# Patient Record
Sex: FEMALE | Race: WHITE | NOT HISPANIC OR LATINO | Employment: OTHER | URBAN - METROPOLITAN AREA
[De-identification: names, ages, dates, MRNs, and addresses within clinical notes are randomized per-mention and may not be internally consistent; named-entity substitution may affect disease eponyms.]

---

## 2019-05-07 ENCOUNTER — TELEPHONE (OUTPATIENT)
Dept: NEUROSURGERY | Facility: CLINIC | Age: 54
End: 2019-05-07

## 2019-05-07 DIAGNOSIS — Z86.018 HISTORY OF MENINGIOMA: Primary | ICD-10-CM

## 2019-06-26 ENCOUNTER — HOSPITAL ENCOUNTER (OUTPATIENT)
Dept: MRI IMAGING | Facility: HOSPITAL | Age: 54
Discharge: HOME | End: 2019-06-26
Admitting: NURSE PRACTITIONER

## 2019-06-26 DIAGNOSIS — Z86.018 HISTORY OF MENINGIOMA: ICD-10-CM

## 2019-06-26 PROCEDURE — 82565 ASSAY OF CREATININE: CPT

## 2019-06-26 PROCEDURE — A9577 INJ MULTIHANCE: HCPCS | Performed by: NURSE PRACTITIONER

## 2019-06-26 PROCEDURE — 70553 MRI BRAIN STEM W/O & W/DYE: CPT

## 2019-06-26 PROCEDURE — 0 GADOBENATE DIMEGLUMINE 529 MG/ML SOLUTION: Performed by: NURSE PRACTITIONER

## 2019-06-26 RX ADMIN — GADOBENATE DIMEGLUMINE 10 ML: 529 INJECTION, SOLUTION INTRAVENOUS at 15:19

## 2019-06-27 LAB — CREAT BLDA-MCNC: 0.8 MG/DL (ref 0.6–1.3)

## 2019-07-25 ENCOUNTER — OFFICE VISIT (OUTPATIENT)
Dept: NEUROSURGERY | Facility: CLINIC | Age: 54
End: 2019-07-25

## 2019-07-25 VITALS
DIASTOLIC BLOOD PRESSURE: 66 MMHG | SYSTOLIC BLOOD PRESSURE: 96 MMHG | HEART RATE: 80 BPM | BODY MASS INDEX: 18.8 KG/M2 | HEIGHT: 66 IN | RESPIRATION RATE: 12 BRPM | WEIGHT: 117 LBS

## 2019-07-25 DIAGNOSIS — H93.A1 PULSATILE TINNITUS OF RIGHT EAR: Primary | ICD-10-CM

## 2019-07-25 DIAGNOSIS — Z86.018 HISTORY OF MENINGIOMA: ICD-10-CM

## 2019-07-25 PROCEDURE — 99214 OFFICE O/P EST MOD 30 MIN: CPT | Performed by: NURSE PRACTITIONER

## 2019-07-25 RX ORDER — LEVOTHYROXINE SODIUM 0.03 MG/1
TABLET ORAL
COMMUNITY
Start: 2019-07-22 | End: 2022-09-01

## 2019-07-25 RX ORDER — ACETAMINOPHEN 500 MG
500 TABLET ORAL EVERY 6 HOURS PRN
COMMUNITY

## 2019-07-25 NOTE — PROGRESS NOTES
"Subjective   Patient ID: Shani Ladd is a 53 y.o. female is here today for follow-up of 3Y meningioma.  Pt is unaccompanied.    History of Present Illness    She returns to the office today for ongoing follow-up with history of a cerebral meningioma.  She has had new MRI brain imaging at [facility] on June 26.  She was last here in July 2016 and MRI imaging revealed no residual or recurrent tumor status post craniotomy and resection in 2012 of the WHO grade 1 meningioma.    She denies any ongoing issues with the exception of migraine headaches.  She does report some pulsatile tinnitus that has been present since she had encephalitis in 2014.  However, over the past 6 months it has worsened to the point that she hears it at all times now and is becoming very difficult to deal with.  She states even at night when lying down she has a hard time sleeping as this is when she hears it more loudly.  It is only in the right ear.  She does continue with migraines in about 6 months ago began developing right-sided headaches.  She denies any vision changes, dizziness or lightheadedness.    She presents unaccompanied.      BP 96/66 (BP Location: Right arm, Patient Position: Sitting, Cuff Size: Adult)   Pulse 80   Resp 12   Ht 167.6 cm (66\")   Wt 53.1 kg (117 lb)   BMI 18.88 kg/m²     The following portions of the patient's history were reviewed and updated as appropriate: allergies, current medications, past family history, past medical history, past social history, past surgical history and problem list.    Review of Systems   Eyes: Positive for visual disturbance (blurred vision).   Musculoskeletal: Positive for gait problem.   Neurological: Positive for dizziness, speech difficulty, weakness, light-headedness and headaches. Negative for tremors, seizures, syncope, facial asymmetry and numbness.   Psychiatric/Behavioral: Positive for confusion and decreased concentration.       Objective   Physical Exam   Constitutional: " She is oriented to person, place, and time. Vital signs are normal. She appears well-developed and well-nourished. She is cooperative.  Non-toxic appearance. She does not have a sickly appearance. She does not appear ill.   Very pleasant well-appearing middle-aged female   HENT:   Head: Normocephalic and atraumatic.   Eyes: EOM are normal.   Neck: Neck supple. No tracheal deviation present.   Cardiovascular: Normal heart sounds and intact distal pulses.   Pulmonary/Chest: Effort normal.   Musculoskeletal:   Moving all extremities well   Neurological: She is alert and oriented to person, place, and time. She has normal strength. She displays no tremor. No cranial nerve deficit. Coordination and gait normal. GCS eye subscore is 4. GCS verbal subscore is 5. GCS motor subscore is 6.   Gait is stable and upright   Skin: Skin is warm and dry.   Psychiatric: She has a normal mood and affect. Her behavior is normal. Thought content normal.   Vitals reviewed.    Neurologic Exam     Mental Status   Oriented to person, place, and time.     Cranial Nerves     CN III, IV, VI   Extraocular motions are normal.     Motor Exam     Strength   Strength 5/5 throughout.       Assessment/Plan   Independent Review of Radiographic Studies:    MRI brain from [facility] dated June 26, 2019 shows stable findings with no evidence of residual or recurrent meningioma.    Medical Decision Making:    Returns to the office today for ongoing follow-up with history of meningioma resection in 2012 that is been followed with serial imaging since.  Exam as noted above, no red flags.  MRI brain shows stable findings with no evidence of recurrent tumor.  She does report worsening pulsatile tinnitus on the right ear that is now resulting in difficulty sleeping.  I have ordered an MRA of her head for further evaluation.  We will see her back in the office once the imaging study is complete.  We will see her back in 4 years for the history of  meningioma.    Plan: MRI head, return to office following  Shani was seen today for 3y meningioma.    Diagnoses and all orders for this visit:    Pulsatile tinnitus of right ear  -     MRI Angiogram Head Without Contrast; Future    History of meningioma  -     MRI Angiogram Head Without Contrast; Future      No Follow-up on file.

## 2019-08-16 ENCOUNTER — HOSPITAL ENCOUNTER (OUTPATIENT)
Dept: MRI IMAGING | Facility: HOSPITAL | Age: 54
Discharge: HOME | End: 2019-08-16
Admitting: NURSE PRACTITIONER

## 2019-08-16 DIAGNOSIS — Z86.018 HISTORY OF MENINGIOMA: ICD-10-CM

## 2019-08-16 DIAGNOSIS — H93.A1 PULSATILE TINNITUS OF RIGHT EAR: ICD-10-CM

## 2019-08-16 PROCEDURE — 70544 MR ANGIOGRAPHY HEAD W/O DYE: CPT

## 2019-08-23 ENCOUNTER — OFFICE VISIT (OUTPATIENT)
Dept: NEUROSURGERY | Facility: CLINIC | Age: 54
End: 2019-08-23

## 2019-08-23 VITALS
HEART RATE: 76 BPM | HEIGHT: 66 IN | SYSTOLIC BLOOD PRESSURE: 104 MMHG | DIASTOLIC BLOOD PRESSURE: 70 MMHG | RESPIRATION RATE: 16 BRPM | BODY MASS INDEX: 18.8 KG/M2 | WEIGHT: 117 LBS

## 2019-08-23 DIAGNOSIS — Z86.018 HISTORY OF MENINGIOMA: ICD-10-CM

## 2019-08-23 DIAGNOSIS — Z86.69 HX OF MIGRAINES: ICD-10-CM

## 2019-08-23 DIAGNOSIS — H93.A1 PULSATILE TINNITUS OF RIGHT EAR: Primary | ICD-10-CM

## 2019-08-23 PROCEDURE — 99214 OFFICE O/P EST MOD 30 MIN: CPT | Performed by: NURSE PRACTITIONER

## 2019-08-23 RX ORDER — TRAMADOL HYDROCHLORIDE 50 MG/1
TABLET ORAL
COMMUNITY
Start: 2019-08-13

## 2019-08-23 RX ORDER — LEVOTHYROXINE SODIUM 50 MCG
TABLET ORAL
COMMUNITY
Start: 2019-08-08 | End: 2022-09-01

## 2019-08-23 NOTE — PROGRESS NOTES
"Subjective   Patient ID: Shani Ladd is a 54 y.o. female is here today for follow-up of tinnitus.  Pt is accompanied by her .    History of Present Illness     She returns to the office today for ongoing follow-up with history of tinnitus as well as history of a cerebral meningioma.  She is status post craniotomy and meningioma resection in 2012 of the WHO grade 1 meningioma.  She has been followed with serial imaging since.  At her last office visit she reported migraine headaches as well as pulsatile tinnitus that has been present since she had encephalitis in 2014.  She stated that over the past 6 months it is worsened to the point that she hears all the time and it is now becoming very difficult to deal with.  She underwent MRA head imaging for further evaluation.    She reports ongoing right-sided pulsatile tinnitus that is overall improved since her last office visit.  Her biggest issue is the ongoing migraine headaches.  She was previously followed by neurology but has not seen a neurologist in \"years.\"  She also feels confused at times with intermittent dizziness.  Otherwise, she states that she is doing and feeling well.    She presents with her .    /70 (BP Location: Right arm, Patient Position: Sitting, Cuff Size: Adult)   Pulse 76   Resp 16   Ht 167.6 cm (66\")   Wt 53.1 kg (117 lb)   BMI 18.88 kg/m²     The following portions of the patient's history were reviewed and updated as appropriate: allergies, current medications, past family history, past medical history, past social history, past surgical history and problem list.    Review of Systems   HENT: Positive for tinnitus (pulsatile).    Musculoskeletal: Negative for gait problem. Myalgias: pain in head.   Neurological: Positive for dizziness, speech difficulty (sometimes), light-headedness and headaches. Negative for tremors, seizures, syncope, facial asymmetry, weakness and numbness.       Objective   Physical Exam "   Constitutional: She is oriented to person, place, and time. Vital signs are normal. She appears well-developed and well-nourished. She is cooperative. She does not have a sickly appearance. She does not appear ill.   Very pleasant well-appearing thin middle-aged female   HENT:   Head: Normocephalic and atraumatic.   Eyes: EOM are normal. Pupils are equal, round, and reactive to light.   Neck: Neck supple.   Pulmonary/Chest: Effort normal.   Musculoskeletal: Normal range of motion.   Moving all extremities well   Neurological: She is alert and oriented to person, place, and time. She has normal strength. She displays no tremor. No cranial nerve deficit. Coordination and gait normal. GCS eye subscore is 4. GCS verbal subscore is 5. GCS motor subscore is 6.   Gait is stable and upright     Skin: Skin is warm and dry.   Psychiatric: She has a normal mood and affect. Her behavior is normal. Thought content normal.   Vitals reviewed.    Neurologic Exam     Mental Status   Oriented to person, place, and time.     Cranial Nerves     CN III, IV, VI   Pupils are equal, round, and reactive to light.  Extraocular motions are normal.     Motor Exam     Strength   Strength 5/5 throughout.       Assessment/Plan   Independent Review of Radiographic Studies:    MRA head from [facility] reveals normal findings.  There is evidence of the left suboccipital craniotomy for resection of the prior meningioma.  No etiology for the right sided pulsatile tinnitus.    Medical Decision Making:    She returns to the office today for ongoing follow-up with history of her cerebral meningioma and more recent right sided pulsatile tinnitus.  Exam as noted above, no red flags.  MRA head from [facility] shows no acute abnormalities.  There is no etiology for the right sided pulsatile tinnitus.  She is clinically stable but does continue to report significant, daily, migraine headache pain.  She is requesting a referral  to neurology.  She is clinically intact from our standpoint and will proceed with normal follow-up in 3 years with MRI brain for continued surveillance of the known meningioma.  She is to call at anytime with any questions or concerns.    Plan: Referral to neurology for headache    Shani was seen today for tinnitus.    Diagnoses and all orders for this visit:    Pulsatile tinnitus of right ear  -     Ambulatory Referral to Neurology    History of meningioma  -     MRI Brain With & Without Contrast; Future    Hx of migraines  -     Ambulatory Referral to Neurology      Return in about 3 years (around 8/23/2022) for Follow up as scheduled.

## 2022-05-04 DIAGNOSIS — Z86.018 HISTORY OF MENINGIOMA: Primary | ICD-10-CM

## 2022-06-03 ENCOUNTER — TELEPHONE (OUTPATIENT)
Dept: NEUROSURGERY | Facility: CLINIC | Age: 57
End: 2022-06-03

## 2022-06-16 ENCOUNTER — TELEPHONE (OUTPATIENT)
Dept: NEUROSURGERY | Facility: CLINIC | Age: 57
End: 2022-06-16

## 2022-08-03 ENCOUNTER — APPOINTMENT (OUTPATIENT)
Dept: MRI IMAGING | Facility: HOSPITAL | Age: 57
End: 2022-08-03

## 2022-08-10 ENCOUNTER — HOSPITAL ENCOUNTER (OUTPATIENT)
Dept: MRI IMAGING | Facility: HOSPITAL | Age: 57
Discharge: HOME | End: 2022-08-10
Admitting: NURSE PRACTITIONER

## 2022-08-10 DIAGNOSIS — Z86.018 HISTORY OF MENINGIOMA: ICD-10-CM

## 2022-08-10 PROCEDURE — A9577 INJ MULTIHANCE: HCPCS | Performed by: NURSE PRACTITIONER

## 2022-08-10 PROCEDURE — 70553 MRI BRAIN STEM W/O & W/DYE: CPT

## 2022-08-10 PROCEDURE — 0 GADOBENATE DIMEGLUMINE 529 MG/ML SOLUTION: Performed by: NURSE PRACTITIONER

## 2022-08-10 RX ADMIN — GADOBENATE DIMEGLUMINE 10 ML: 529 INJECTION, SOLUTION INTRAVENOUS at 15:32

## 2022-09-01 ENCOUNTER — OFFICE VISIT (OUTPATIENT)
Dept: NEUROSURGERY | Facility: CLINIC | Age: 57
End: 2022-09-01

## 2022-09-01 VITALS
BODY MASS INDEX: 18.96 KG/M2 | SYSTOLIC BLOOD PRESSURE: 104 MMHG | OXYGEN SATURATION: 99 % | HEIGHT: 66 IN | WEIGHT: 118 LBS | DIASTOLIC BLOOD PRESSURE: 72 MMHG | HEART RATE: 79 BPM

## 2022-09-01 DIAGNOSIS — Z86.018 HISTORY OF MENINGIOMA: Primary | ICD-10-CM

## 2022-09-01 PROCEDURE — 99203 OFFICE O/P NEW LOW 30 MIN: CPT | Performed by: NURSE PRACTITIONER

## 2022-09-01 RX ORDER — LEVOTHYROXINE SODIUM 0.07 MG/1
TABLET ORAL
COMMUNITY

## 2022-09-01 RX ORDER — LAMOTRIGINE 100 MG/1
TABLET, EXTENDED RELEASE ORAL
COMMUNITY

## 2022-09-01 RX ORDER — PANTOPRAZOLE SODIUM 40 MG/1
TABLET, DELAYED RELEASE ORAL
COMMUNITY
Start: 2022-08-01

## 2025-02-17 ENCOUNTER — TELEPHONE (OUTPATIENT)
Dept: FAMILY MEDICINE CLINIC | Facility: CLINIC | Age: 60
End: 2025-02-17
Payer: COMMERCIAL

## 2025-02-17 NOTE — TELEPHONE ENCOUNTER
Caller: Shani Ladd    Relationship to patient: Self    Best call back number: [phone]     Type of visit: NEW PATIENT    Additional notes:PATIENT WAS SPECIFICALLY REFERRED TO DR BAKER BY DR JARED JUDGE DUE TO HER UNIQUE SITUATION

## 2025-06-16 ENCOUNTER — TELEPHONE (OUTPATIENT)
Dept: NEUROSURGERY | Facility: CLINIC | Age: 60
End: 2025-06-16
Payer: COMMERCIAL

## 2025-06-16 NOTE — TELEPHONE ENCOUNTER
Patient had crani with Dr. Vasques 13 years ago and has been followed by different APRN's since then for meningioma. She is due to follow up but am unsure of what imaging she would need. Can you order whatever imaging you would like her to have and I can schedule appt with Dr. Taylor afterwards? Thank you.

## 2025-06-16 NOTE — TELEPHONE ENCOUNTER
"  Caller: MARYURI    Relationship: SELF    Best call back number: [phone]    What was the call regarding: PATIENT CALLED STATING 3 DAYS AGO SHE HAD A SHRILL SOUND IN HER LEFT EAR AND SINCE THEN STATES CERTAIN SOUNDS SOUND REALLY WEIRD IN THE LEFT EAR - ALSO STATES YESTERDAY SHE GOT REALLY DIZZY WHEN BLOW DRYING HER HAIR - AND HAVE HAD A DULL HEADACHE FOR THE LAST 3 DAYS     PATIENT HAD MENINGIOMA SX 13 YRS  AGO    PATIENT NEEDS TO SCHEDULE A 3 YR FOLLOW UP   \"Return in about 3 years (around 9/1/2025) for With APC, After Imaging.\"    PLEASE ADVISE IF PATIENT IS NEEDING TO BE SEEN BY NS OR ENT    THANK YOU  "

## 2025-06-17 DIAGNOSIS — Z86.018 HISTORY OF MENINGIOMA: Primary | ICD-10-CM

## 2025-06-19 NOTE — TELEPHONE ENCOUNTER
Spoke with patient and gave her scheduling's phone number so that she can get the MRI brain scheduled and then she will call me back so that I can get an appt for her with Dr. Taylor after the MRI. She voiced understanding.

## 2025-07-15 ENCOUNTER — HOSPITAL ENCOUNTER (OUTPATIENT)
Dept: MRI IMAGING | Facility: HOSPITAL | Age: 60
Discharge: HOME | End: 2025-07-15
Admitting: NURSE PRACTITIONER
Payer: COMMERCIAL

## 2025-07-15 DIAGNOSIS — Z86.018 HISTORY OF MENINGIOMA: ICD-10-CM

## 2025-07-15 PROCEDURE — A9577 INJ MULTIHANCE: HCPCS | Performed by: NURSE PRACTITIONER

## 2025-07-15 PROCEDURE — 25510000002 GADOBENATE DIMEGLUMINE 529 MG/ML SOLUTION: Performed by: NURSE PRACTITIONER

## 2025-07-15 PROCEDURE — 70553 MRI BRAIN STEM W/O & W/DYE: CPT

## 2025-07-15 RX ADMIN — GADOBENATE DIMEGLUMINE 10 ML: 529 INJECTION, SOLUTION INTRAVENOUS at 15:55

## 2025-07-21 NOTE — PROGRESS NOTES
Subjective   History of Present Illness: Shani Ladd is a 59 y.o. female is here today for follow-up on meningioma. MRI Brain 7/15/25.  She has a history of a craniotomy for tumor resection in  by another neurosurgeon.  She presents today for routine follow-up to monitor for any recurrence of her meningioma.  She reports that she tripped and fell and hit her head on the corner of a wall in January.  She reports she had a loss of consciousness for an unknown period of time.  She reports that since the head injury she has had some word finding difficulty and episodes of confusion.  She also reports an unsteady gait.    History of Present Illness    The following portions of the patient's history were reviewed and updated as appropriate: allergies, current medications, past family history, past medical history, past social history, past surgical history, and problem list.    Past Medical History:   Diagnosis Date    Anemia     Arthritis     Broken bones     Depression     Endometriosis     History of encephalitis     History of kidney problems     acute renal kidneys    History of migraine headaches     History of transfusion     Migraine     Tinnitus     pulsar        Past Surgical History:   Procedure Laterality Date    ABDOMINAL SURGERY      removal of adesions    BRAIN MENINGIOMA EXCISION  2012    crainotomy/  Dr AISSATOU Vasques    BRAIN TUMOR EXCISION       SECTION      x 2    CYST REMOVAL      urethral cyst and glands removed    HYSTERECTOMY      LAPAROTOMY OOPHERECTOMY Left 2006    TONSILLECTOMY      URETHRA SURGERY            Current Outpatient Medications:     acetaminophen (TYLENOL) 500 MG tablet, Take 1 tablet by mouth Every 6 (Six) Hours As Needed for Mild Pain., Disp: , Rfl:     ESTRACE VAGINAL 0.1 MG/GM vaginal cream, , Disp: , Rfl:     estradiol (MINIVELLE, VIVELLE-DOT) 0.1 MG/24HR patch, , Disp: , Rfl:     lamoTRIgine  MG tablet sustained-release 24 hour, , Disp: , Rfl:      levothyroxine (SYNTHROID, LEVOTHROID) 75 MCG tablet, , Disp: , Rfl:     linaclotide (LINZESS) 290 MCG capsule capsule, , Disp: , Rfl:     Multiple Vitamins-Minerals (CENTRUM SILVER) tablet, Take by mouth, Disp: , Rfl:     pantoprazole (PROTONIX) 40 MG EC tablet, , Disp: , Rfl:     SUMAtriptan (IMITREX) 100 MG tablet, Take  by mouth 1 (one) time as needed., Disp: , Rfl:     topiramate (TOPAMAX) 100 MG tablet, Take 1 tablet by mouth 2 (Two) Times a Day., Disp: , Rfl:     traMADol (ULTRAM) 50 MG tablet, , Disp: , Rfl:     CBD (cannabidiol) oral oil, Take  by mouth., Disp: , Rfl:     hydrocortisone 2.5 % ointment, , Disp: , Rfl:     LORazepam (ATIVAN) 1 MG tablet, Take 0.5 tablets by mouth As Needed. 1/2 at bedtime, Disp: , Rfl:     naproxen sodium (ALEVE) 220 MG tablet, Take by mouth Take 2 capsules once weekly , Disp: , Rfl:     pentosan polysulfate (ELMIRON) 100 MG capsule, Take 1 capsule by mouth. Take 2 capsules twice daily, Disp: , Rfl:      Allergies   Allergen Reactions    Penicillins Shortness Of Breath    Erythromycin Dizziness and Nausea Only    Sulfa Antibiotics Nausea Only, GI Intolerance and Dizziness     Intense dizziness        Social History     Socioeconomic History    Marital status:    Tobacco Use    Smoking status: Never    Smokeless tobacco: Never   Substance and Sexual Activity    Alcohol use: No    Drug use: No        Family History   Problem Relation Age of Onset    Aneurysm Father     Stroke Father     Cancer Maternal Grandmother     Stroke Maternal Grandmother     Stroke Paternal Grandmother         Review of Systems   Gastrointestinal:  Positive for nausea. Negative for vomiting.   Musculoskeletal:  Positive for gait problem.   Neurological:  Positive for dizziness, speech difficulty (trouble with finding words), light-headedness and headaches. Negative for seizures.   All other systems reviewed and are negative.      Objective     Vitals:    07/23/25 1246   BP: 122/80   Pulse: 89  "  SpO2: 100%   Weight: 44.9 kg (98 lb 14.4 oz)   Height: 167.6 cm (66\")     Body mass index is 15.96 kg/m².      Physical Exam  Awake, alert, oriented x3  Pupils equal round reactive to light  Extraocular muscles intact  Face symmetric  Speech is fluent and clear  No pronator drift  Motor exam  Bilateral deltoids 5/5, bilateral biceps 5/5, bilateral triceps 5/5, bilateral wrist extension 5/5 bilateral hand  5/5  Bilateral hip flexion 5/5, bilateral knee extension 5/5, bilateral DF/PF 5/5  No clonus  No Patience's reflex  Steady normal gait  Able to detect  light touch in all 4 extremities        Assessment & Plan   Independent Review of Radiographic Studies:      I personally reviewed the images from the following studies.  MRI brain with and without contrast from July 15, 2025 and August 10, 2022  The follow-up MRI images show no evidence of hemorrhage or infarct.  No evidence of meningioma.    Medical Decision Makin-year-old female with history of craniotomy for resection of meningioma in  by another neurosurgeon  -She presents today for routine follow-up with a follow-up MRI.  The MRI images show no evidence of recurrent or residual meningioma.  I will plan to have her follow-up in 5 years with a repeat MRI for continued surveillance  -She reports that since January she has had episodes of word finding difficulty, confusion, unsteady gait.  She is requesting a referral to neurology for further evaluation.  She also reports that she has lost a significant amount of weight due to abdominal pain after eating.  It is possible she has developed a nutritional deficiency.  I have asked her to discuss these symptoms and concerns with neurology during her evaluation.  Of note this also may be related to a concussion that she had in January after tripping and falling and hitting her head against the wall.  The MRI images show no obvious long-lasting structural injuries.  Diagnoses and all orders for this " visit:    1. History of meningioma (Primary)  -     MRI Brain With & Without Contrast; Future    2. Cognitive decline  -     Ambulatory Referral to Neurology    3. Word finding difficulty  -     Ambulatory Referral to Neurology    4. Unsteady gait  -     Ambulatory Referral to Neurology      Return in about 5 years (around 7/23/2030).    I spent 40 minutes reviewing the medical record, reviewing the MRI images, discussing the management of meningiomas, discussing the risks of a meningioma recurrence, discussing causes of cognitive decline, discussing signs and symptoms of a severe concussion

## 2025-07-23 ENCOUNTER — OFFICE VISIT (OUTPATIENT)
Dept: NEUROSURGERY | Facility: CLINIC | Age: 60
End: 2025-07-23
Payer: COMMERCIAL

## 2025-07-23 VITALS
OXYGEN SATURATION: 100 % | WEIGHT: 98.9 LBS | BODY MASS INDEX: 15.89 KG/M2 | HEIGHT: 66 IN | HEART RATE: 89 BPM | SYSTOLIC BLOOD PRESSURE: 122 MMHG | DIASTOLIC BLOOD PRESSURE: 80 MMHG

## 2025-07-23 DIAGNOSIS — R47.89 WORD FINDING DIFFICULTY: ICD-10-CM

## 2025-07-23 DIAGNOSIS — Z86.018 HISTORY OF MENINGIOMA: Primary | ICD-10-CM

## 2025-07-23 DIAGNOSIS — R41.89 COGNITIVE DECLINE: ICD-10-CM

## 2025-07-23 DIAGNOSIS — R26.81 UNSTEADY GAIT: ICD-10-CM

## 2025-08-14 ENCOUNTER — OFFICE VISIT (OUTPATIENT)
Dept: FAMILY MEDICINE CLINIC | Facility: CLINIC | Age: 60
End: 2025-08-14
Payer: COMMERCIAL

## 2025-08-14 VITALS
SYSTOLIC BLOOD PRESSURE: 108 MMHG | OXYGEN SATURATION: 99 % | HEIGHT: 66 IN | BODY MASS INDEX: 16.1 KG/M2 | HEART RATE: 79 BPM | DIASTOLIC BLOOD PRESSURE: 72 MMHG | WEIGHT: 100.2 LBS

## 2025-08-14 DIAGNOSIS — Z00.00 ENCOUNTER FOR MEDICAL EXAMINATION TO ESTABLISH CARE: Primary | ICD-10-CM

## 2025-08-14 DIAGNOSIS — Z00.00 ENCOUNTER FOR WELLNESS EXAMINATION IN ADULT: ICD-10-CM

## 2025-08-14 DIAGNOSIS — G43.009 MIGRAINE WITHOUT AURA AND WITHOUT STATUS MIGRAINOSUS, NOT INTRACTABLE: ICD-10-CM

## 2025-08-14 DIAGNOSIS — E03.9 ACQUIRED HYPOTHYROIDISM: ICD-10-CM

## 2025-08-14 RX ORDER — SUMATRIPTAN SUCCINATE 100 MG/1
100 TABLET ORAL ONCE AS NEEDED
Qty: 16 TABLET | Refills: 3 | Status: SHIPPED | OUTPATIENT
Start: 2025-08-14

## 2025-08-14 RX ORDER — LEVOTHYROXINE SODIUM 75 UG/1
75 TABLET ORAL
Qty: 90 TABLET | Refills: 1 | Status: SHIPPED | OUTPATIENT
Start: 2025-08-14

## 2025-08-14 RX ORDER — SODIUM FLUORIDE 6 MG/ML
PASTE, DENTIFRICE DENTAL
COMMUNITY
Start: 2025-07-10

## 2025-08-14 RX ORDER — TOPIRAMATE 100 MG/1
100 TABLET, FILM COATED ORAL DAILY
Qty: 90 TABLET | Refills: 3 | Status: SHIPPED | OUTPATIENT
Start: 2025-08-14